# Patient Record
Sex: FEMALE | Race: WHITE | Employment: FULL TIME | URBAN - METROPOLITAN AREA
[De-identification: names, ages, dates, MRNs, and addresses within clinical notes are randomized per-mention and may not be internally consistent; named-entity substitution may affect disease eponyms.]

---

## 2017-01-24 DIAGNOSIS — R56.9 CONVULSIONS, UNSPECIFIED CONVULSION TYPE (HCC): ICD-10-CM

## 2017-01-24 RX ORDER — LEVETIRACETAM 500 MG/1
500 TABLET ORAL 2 TIMES DAILY
Qty: 60 TABLET | Refills: 0 | Status: SHIPPED | OUTPATIENT
Start: 2017-01-24 | End: 2017-04-25 | Stop reason: SDUPTHER

## 2017-01-24 RX ORDER — LEVETIRACETAM 500 MG/1
TABLET ORAL
Qty: 60 TABLET | Refills: 5 | Status: SHIPPED | OUTPATIENT
Start: 2017-01-24 | End: 2017-08-04 | Stop reason: SDUPTHER

## 2017-03-07 DIAGNOSIS — F41.9 ANXIETY: ICD-10-CM

## 2017-03-07 RX ORDER — ESCITALOPRAM OXALATE 20 MG/1
20 TABLET ORAL DAILY
Qty: 30 TABLET | Refills: 0 | Status: SHIPPED | OUTPATIENT
Start: 2017-03-07 | End: 2017-04-06 | Stop reason: SDUPTHER

## 2017-03-15 ENCOUNTER — TELEPHONE (OUTPATIENT)
Dept: INTERNAL MEDICINE CLINIC | Age: 32
End: 2017-03-15

## 2017-04-06 DIAGNOSIS — F41.9 ANXIETY: ICD-10-CM

## 2017-04-06 RX ORDER — ESCITALOPRAM OXALATE 20 MG/1
TABLET ORAL
Qty: 30 TABLET | Refills: 2 | Status: SHIPPED | OUTPATIENT
Start: 2017-04-06 | End: 2017-07-14 | Stop reason: SDUPTHER

## 2017-04-25 ENCOUNTER — OFFICE VISIT (OUTPATIENT)
Dept: OBGYN CLINIC | Age: 32
End: 2017-04-25
Payer: COMMERCIAL

## 2017-04-25 ENCOUNTER — HOSPITAL ENCOUNTER (OUTPATIENT)
Age: 32
Setting detail: SPECIMEN
Discharge: HOME | End: 2017-04-25
Payer: COMMERCIAL

## 2017-04-25 VITALS — DIASTOLIC BLOOD PRESSURE: 62 MMHG | BODY MASS INDEX: 34.33 KG/M2 | SYSTOLIC BLOOD PRESSURE: 118 MMHG | WEIGHT: 200 LBS

## 2017-04-25 DIAGNOSIS — R87.610 ASCUS WITH POSITIVE HIGH RISK HPV CERVICAL: Primary | ICD-10-CM

## 2017-04-25 DIAGNOSIS — R87.810 ASCUS WITH POSITIVE HIGH RISK HPV CERVICAL: Primary | ICD-10-CM

## 2017-04-25 DIAGNOSIS — R10.2 CHRONIC PELVIC PAIN IN FEMALE: ICD-10-CM

## 2017-04-25 DIAGNOSIS — G89.29 CHRONIC PELVIC PAIN IN FEMALE: ICD-10-CM

## 2017-04-25 DIAGNOSIS — Z87.42: ICD-10-CM

## 2017-04-25 PROCEDURE — 99213 OFFICE O/P EST LOW 20 MIN: CPT | Performed by: NURSE PRACTITIONER

## 2017-04-25 ASSESSMENT — ENCOUNTER SYMPTOMS
BACK PAIN: 0
ABDOMINAL DISTENTION: 0
BLOOD IN STOOL: 0
CONSTIPATION: 0
VOMITING: 0
PHOTOPHOBIA: 0
WHEEZING: 0
COLOR CHANGE: 0
CHEST TIGHTNESS: 0
DIARRHEA: 0
NAUSEA: 0
ABDOMINAL PAIN: 0
COUGH: 0
SHORTNESS OF BREATH: 0

## 2017-05-01 LAB — CYTOLOGY REPORT: NORMAL

## 2017-05-02 ENCOUNTER — TELEPHONE (OUTPATIENT)
Dept: OBGYN CLINIC | Age: 32
End: 2017-05-02

## 2017-07-14 DIAGNOSIS — F41.9 ANXIETY: ICD-10-CM

## 2017-07-14 RX ORDER — ESCITALOPRAM OXALATE 20 MG/1
TABLET ORAL
Qty: 30 TABLET | Refills: 2 | Status: SHIPPED | OUTPATIENT
Start: 2017-07-14 | End: 2017-10-16 | Stop reason: SDUPTHER

## 2017-08-04 DIAGNOSIS — R56.9 CONVULSIONS, UNSPECIFIED CONVULSION TYPE (HCC): ICD-10-CM

## 2017-08-04 RX ORDER — LEVETIRACETAM 500 MG/1
TABLET ORAL
Qty: 60 TABLET | Refills: 5 | Status: SHIPPED | OUTPATIENT
Start: 2017-08-04 | End: 2018-01-26 | Stop reason: SDUPTHER

## 2017-08-28 ENCOUNTER — HOSPITAL ENCOUNTER (EMERGENCY)
Age: 32
Discharge: HOME | End: 2017-08-28
Attending: EMERGENCY MEDICINE
Payer: COMMERCIAL

## 2017-08-28 VITALS
WEIGHT: 201.9 LBS | SYSTOLIC BLOOD PRESSURE: 112 MMHG | HEART RATE: 89 BPM | BODY MASS INDEX: 34.47 KG/M2 | OXYGEN SATURATION: 100 % | HEIGHT: 64 IN | TEMPERATURE: 98.8 F | RESPIRATION RATE: 16 BRPM | DIASTOLIC BLOOD PRESSURE: 76 MMHG

## 2017-08-28 DIAGNOSIS — K08.89 ODONTALGIA: Primary | ICD-10-CM

## 2017-08-28 PROCEDURE — 99282 EMERGENCY DEPT VISIT SF MDM: CPT

## 2017-08-28 RX ORDER — HYDROCODONE BITARTRATE AND ACETAMINOPHEN 5; 325 MG/1; MG/1
1 TABLET ORAL EVERY 6 HOURS PRN
Qty: 20 TABLET | Refills: 0 | Status: SHIPPED | OUTPATIENT
Start: 2017-08-28 | End: 2018-01-26 | Stop reason: ALTCHOICE

## 2017-08-28 RX ORDER — IBUPROFEN 800 MG/1
800 TABLET ORAL EVERY 8 HOURS PRN
Qty: 20 TABLET | Refills: 0 | Status: SHIPPED | OUTPATIENT
Start: 2017-08-28 | End: 2020-01-20

## 2017-08-28 RX ORDER — PENICILLIN V POTASSIUM 250 MG/1
250 TABLET ORAL 4 TIMES DAILY
Qty: 40 TABLET | Refills: 0 | Status: SHIPPED | OUTPATIENT
Start: 2017-08-28 | End: 2017-09-07

## 2017-08-28 ASSESSMENT — PAIN DESCRIPTION - LOCATION: LOCATION: MOUTH;TEETH

## 2017-08-28 ASSESSMENT — PAIN DESCRIPTION - DESCRIPTORS: DESCRIPTORS: ACHING;PRESSURE;POUNDING

## 2017-08-28 ASSESSMENT — PAIN DESCRIPTION - ORIENTATION: ORIENTATION: RIGHT;UPPER

## 2017-08-28 ASSESSMENT — ENCOUNTER SYMPTOMS
COLOR CHANGE: 0
COUGH: 0
ABDOMINAL PAIN: 0
EYE DISCHARGE: 0
VOMITING: 0
FACIAL SWELLING: 0
EYE REDNESS: 0
DIARRHEA: 0
SHORTNESS OF BREATH: 0
CONSTIPATION: 0

## 2017-08-28 ASSESSMENT — PAIN DESCRIPTION - FREQUENCY: FREQUENCY: CONTINUOUS

## 2017-08-28 ASSESSMENT — PAIN SCALES - GENERAL: PAINLEVEL_OUTOF10: 10

## 2017-10-16 DIAGNOSIS — F41.9 ANXIETY: ICD-10-CM

## 2017-10-16 RX ORDER — ESCITALOPRAM OXALATE 20 MG/1
TABLET ORAL
Qty: 30 TABLET | Refills: 2 | Status: SHIPPED | OUTPATIENT
Start: 2017-10-16 | End: 2018-01-24 | Stop reason: SDUPTHER

## 2018-01-24 DIAGNOSIS — F41.9 ANXIETY: ICD-10-CM

## 2018-01-24 RX ORDER — ESCITALOPRAM OXALATE 20 MG/1
20 TABLET ORAL DAILY
Qty: 7 TABLET | Refills: 0 | Status: SHIPPED | OUTPATIENT
Start: 2018-01-24 | End: 2018-01-26 | Stop reason: SDUPTHER

## 2018-01-26 ENCOUNTER — OFFICE VISIT (OUTPATIENT)
Dept: INTERNAL MEDICINE CLINIC | Age: 33
End: 2018-01-26
Payer: COMMERCIAL

## 2018-01-26 VITALS
WEIGHT: 203 LBS | DIASTOLIC BLOOD PRESSURE: 72 MMHG | HEIGHT: 64 IN | BODY MASS INDEX: 34.66 KG/M2 | SYSTOLIC BLOOD PRESSURE: 130 MMHG | HEART RATE: 95 BPM | OXYGEN SATURATION: 92 %

## 2018-01-26 DIAGNOSIS — J11.1 FLU: ICD-10-CM

## 2018-01-26 DIAGNOSIS — R56.9 CONVULSIONS, UNSPECIFIED CONVULSION TYPE (HCC): ICD-10-CM

## 2018-01-26 DIAGNOSIS — I49.3 PVC (PREMATURE VENTRICULAR CONTRACTION): Primary | ICD-10-CM

## 2018-01-26 DIAGNOSIS — F41.9 ANXIETY: ICD-10-CM

## 2018-01-26 PROCEDURE — 99214 OFFICE O/P EST MOD 30 MIN: CPT | Performed by: INTERNAL MEDICINE

## 2018-01-26 PROCEDURE — G8484 FLU IMMUNIZE NO ADMIN: HCPCS | Performed by: INTERNAL MEDICINE

## 2018-01-26 PROCEDURE — G8417 CALC BMI ABV UP PARAM F/U: HCPCS | Performed by: INTERNAL MEDICINE

## 2018-01-26 PROCEDURE — G8427 DOCREV CUR MEDS BY ELIG CLIN: HCPCS | Performed by: INTERNAL MEDICINE

## 2018-01-26 PROCEDURE — 4004F PT TOBACCO SCREEN RCVD TLK: CPT | Performed by: INTERNAL MEDICINE

## 2018-01-26 RX ORDER — ESCITALOPRAM OXALATE 20 MG/1
20 TABLET ORAL DAILY
Qty: 7 TABLET | Refills: 0 | Status: SHIPPED | OUTPATIENT
Start: 2018-01-26 | End: 2018-02-12 | Stop reason: SDUPTHER

## 2018-01-26 RX ORDER — LEVETIRACETAM 500 MG/1
500 TABLET ORAL 2 TIMES DAILY
Qty: 60 TABLET | Refills: 5 | Status: SHIPPED | OUTPATIENT
Start: 2018-01-26 | End: 2018-08-31 | Stop reason: SDUPTHER

## 2018-01-26 ASSESSMENT — ENCOUNTER SYMPTOMS
APNEA: 0
DIARRHEA: 0
EYE PAIN: 0
BLOOD IN STOOL: 0
CONSTIPATION: 0
SHORTNESS OF BREATH: 1
ABDOMINAL PAIN: 0
CHEST TIGHTNESS: 0
EYE DISCHARGE: 0
EYE ITCHING: 0
COUGH: 0
COLOR CHANGE: 0
BACK PAIN: 0
ABDOMINAL DISTENTION: 0
EYE REDNESS: 0
CHOKING: 0

## 2018-01-26 ASSESSMENT — PATIENT HEALTH QUESTIONNAIRE - PHQ9
SUM OF ALL RESPONSES TO PHQ9 QUESTIONS 1 & 2: 2
2. FEELING DOWN, DEPRESSED OR HOPELESS: 1
SUM OF ALL RESPONSES TO PHQ QUESTIONS 1-9: 2
1. LITTLE INTEREST OR PLEASURE IN DOING THINGS: 1

## 2018-02-12 DIAGNOSIS — F41.9 ANXIETY: ICD-10-CM

## 2018-02-12 RX ORDER — ESCITALOPRAM OXALATE 20 MG/1
TABLET ORAL
Qty: 30 TABLET | Refills: 5 | Status: SHIPPED | OUTPATIENT
Start: 2018-02-12 | End: 2018-08-31 | Stop reason: SDUPTHER

## 2018-08-31 DIAGNOSIS — R56.9 CONVULSIONS, UNSPECIFIED CONVULSION TYPE (HCC): ICD-10-CM

## 2018-08-31 DIAGNOSIS — F41.9 ANXIETY: ICD-10-CM

## 2018-08-31 RX ORDER — LEVETIRACETAM 500 MG/1
500 TABLET ORAL 2 TIMES DAILY
Qty: 60 TABLET | Refills: 5 | Status: SHIPPED | OUTPATIENT
Start: 2018-08-31 | End: 2019-03-13 | Stop reason: SDUPTHER

## 2018-09-04 RX ORDER — ESCITALOPRAM OXALATE 20 MG/1
20 TABLET ORAL DAILY
Qty: 30 TABLET | Refills: 5 | Status: SHIPPED | OUTPATIENT
Start: 2018-09-04 | End: 2019-03-13 | Stop reason: SDUPTHER

## 2018-09-10 ENCOUNTER — TELEPHONE (OUTPATIENT)
Dept: INTERNAL MEDICINE CLINIC | Age: 33
End: 2018-09-10

## 2018-09-10 NOTE — TELEPHONE ENCOUNTER
Spoke w/ pt & reminded her of her appt today w/ Dr. Jen Rodriguez d/t high no show rate. Pt voiced understanding.

## 2019-03-05 ENCOUNTER — OFFICE VISIT (OUTPATIENT)
Dept: INTERNAL MEDICINE CLINIC | Age: 34
End: 2019-03-05
Payer: COMMERCIAL

## 2019-03-05 VITALS
WEIGHT: 198 LBS | OXYGEN SATURATION: 94 % | HEART RATE: 81 BPM | DIASTOLIC BLOOD PRESSURE: 84 MMHG | BODY MASS INDEX: 33.8 KG/M2 | HEIGHT: 64 IN | SYSTOLIC BLOOD PRESSURE: 118 MMHG

## 2019-03-05 DIAGNOSIS — R42 DIZZINESS: ICD-10-CM

## 2019-03-05 DIAGNOSIS — R51.9 NONINTRACTABLE EPISODIC HEADACHE, UNSPECIFIED HEADACHE TYPE: ICD-10-CM

## 2019-03-05 DIAGNOSIS — R56.9 SEIZURES (HCC): ICD-10-CM

## 2019-03-05 DIAGNOSIS — D22.9 CHANGING NEVUS: ICD-10-CM

## 2019-03-05 DIAGNOSIS — Z13.31 POSITIVE DEPRESSION SCREENING: Primary | ICD-10-CM

## 2019-03-05 PROCEDURE — G8417 CALC BMI ABV UP PARAM F/U: HCPCS | Performed by: PHYSICIAN ASSISTANT

## 2019-03-05 PROCEDURE — G8427 DOCREV CUR MEDS BY ELIG CLIN: HCPCS | Performed by: PHYSICIAN ASSISTANT

## 2019-03-05 PROCEDURE — 96160 PT-FOCUSED HLTH RISK ASSMT: CPT | Performed by: PHYSICIAN ASSISTANT

## 2019-03-05 PROCEDURE — G8431 POS CLIN DEPRES SCRN F/U DOC: HCPCS | Performed by: PHYSICIAN ASSISTANT

## 2019-03-05 PROCEDURE — 99214 OFFICE O/P EST MOD 30 MIN: CPT | Performed by: PHYSICIAN ASSISTANT

## 2019-03-05 PROCEDURE — G8484 FLU IMMUNIZE NO ADMIN: HCPCS | Performed by: PHYSICIAN ASSISTANT

## 2019-03-05 PROCEDURE — 4004F PT TOBACCO SCREEN RCVD TLK: CPT | Performed by: PHYSICIAN ASSISTANT

## 2019-03-05 RX ORDER — HYDROXYZINE PAMOATE 25 MG/1
25 CAPSULE ORAL 3 TIMES DAILY PRN
Qty: 90 CAPSULE | Refills: 1 | Status: SHIPPED | OUTPATIENT
Start: 2019-03-05 | End: 2019-03-19

## 2019-03-05 ASSESSMENT — ENCOUNTER SYMPTOMS
TROUBLE SWALLOWING: 0
DIARRHEA: 0
RHINORRHEA: 0
ABDOMINAL PAIN: 0
EYE ITCHING: 0
VOMITING: 0
COLOR CHANGE: 0
SORE THROAT: 0
WHEEZING: 0
BACK PAIN: 0
SHORTNESS OF BREATH: 0
EYE PAIN: 0
CONSTIPATION: 0
CHEST TIGHTNESS: 0
SINUS PAIN: 0
EYE DISCHARGE: 0
BLOOD IN STOOL: 0
NAUSEA: 0
COUGH: 0

## 2019-03-05 ASSESSMENT — PATIENT HEALTH QUESTIONNAIRE - PHQ9
6. FEELING BAD ABOUT YOURSELF - OR THAT YOU ARE A FAILURE OR HAVE LET YOURSELF OR YOUR FAMILY DOWN: 0
5. POOR APPETITE OR OVEREATING: 3
7. TROUBLE CONCENTRATING ON THINGS, SUCH AS READING THE NEWSPAPER OR WATCHING TELEVISION: 0
4. FEELING TIRED OR HAVING LITTLE ENERGY: 3
1. LITTLE INTEREST OR PLEASURE IN DOING THINGS: 2
2. FEELING DOWN, DEPRESSED OR HOPELESS: 3
9. THOUGHTS THAT YOU WOULD BE BETTER OFF DEAD, OR OF HURTING YOURSELF: 0
SUM OF ALL RESPONSES TO PHQ QUESTIONS 1-9: 11
3. TROUBLE FALLING OR STAYING ASLEEP: 0
8. MOVING OR SPEAKING SO SLOWLY THAT OTHER PEOPLE COULD HAVE NOTICED. OR THE OPPOSITE, BEING SO FIGETY OR RESTLESS THAT YOU HAVE BEEN MOVING AROUND A LOT MORE THAN USUAL: 0
SUM OF ALL RESPONSES TO PHQ9 QUESTIONS 1 & 2: 5
SUM OF ALL RESPONSES TO PHQ QUESTIONS 1-9: 11
10. IF YOU CHECKED OFF ANY PROBLEMS, HOW DIFFICULT HAVE THESE PROBLEMS MADE IT FOR YOU TO DO YOUR WORK, TAKE CARE OF THINGS AT HOME, OR GET ALONG WITH OTHER PEOPLE: 0

## 2019-03-13 DIAGNOSIS — F41.9 ANXIETY: ICD-10-CM

## 2019-03-13 DIAGNOSIS — R56.9 CONVULSIONS, UNSPECIFIED CONVULSION TYPE (HCC): ICD-10-CM

## 2019-03-13 RX ORDER — ESCITALOPRAM OXALATE 20 MG/1
20 TABLET ORAL DAILY
Qty: 90 TABLET | Refills: 3 | Status: SHIPPED | OUTPATIENT
Start: 2019-03-13 | End: 2020-03-05

## 2019-03-13 RX ORDER — LEVETIRACETAM 500 MG/1
500 TABLET ORAL 2 TIMES DAILY
Qty: 180 TABLET | Refills: 3 | Status: SHIPPED | OUTPATIENT
Start: 2019-03-13 | End: 2020-03-05

## 2020-01-20 ENCOUNTER — OFFICE VISIT (OUTPATIENT)
Dept: INTERNAL MEDICINE CLINIC | Age: 35
End: 2020-01-20
Payer: COMMERCIAL

## 2020-01-20 VITALS
WEIGHT: 184 LBS | HEART RATE: 96 BPM | DIASTOLIC BLOOD PRESSURE: 64 MMHG | OXYGEN SATURATION: 94 % | BODY MASS INDEX: 31.41 KG/M2 | HEIGHT: 64 IN | SYSTOLIC BLOOD PRESSURE: 122 MMHG

## 2020-01-20 PROCEDURE — G8484 FLU IMMUNIZE NO ADMIN: HCPCS | Performed by: PHYSICIAN ASSISTANT

## 2020-01-20 PROCEDURE — G8417 CALC BMI ABV UP PARAM F/U: HCPCS | Performed by: PHYSICIAN ASSISTANT

## 2020-01-20 PROCEDURE — 99214 OFFICE O/P EST MOD 30 MIN: CPT | Performed by: PHYSICIAN ASSISTANT

## 2020-01-20 PROCEDURE — 4004F PT TOBACCO SCREEN RCVD TLK: CPT | Performed by: PHYSICIAN ASSISTANT

## 2020-01-20 PROCEDURE — G8427 DOCREV CUR MEDS BY ELIG CLIN: HCPCS | Performed by: PHYSICIAN ASSISTANT

## 2020-01-20 RX ORDER — SUMATRIPTAN 50 MG/1
50 TABLET, FILM COATED ORAL
Qty: 9 TABLET | Refills: 0 | Status: SHIPPED | OUTPATIENT
Start: 2020-01-20 | End: 2021-03-19 | Stop reason: SDUPTHER

## 2020-01-20 ASSESSMENT — PATIENT HEALTH QUESTIONNAIRE - PHQ9
SUM OF ALL RESPONSES TO PHQ QUESTIONS 1-9: 0
1. LITTLE INTEREST OR PLEASURE IN DOING THINGS: 0
SUM OF ALL RESPONSES TO PHQ QUESTIONS 1-9: 0
2. FEELING DOWN, DEPRESSED OR HOPELESS: 0
SUM OF ALL RESPONSES TO PHQ9 QUESTIONS 1 & 2: 0

## 2020-01-20 NOTE — PROGRESS NOTES
SUMAtriptan (IMITREX) 50 MG tablet Take 1 tablet by mouth once as needed for Migraine 9 tablet 0    escitalopram (LEXAPRO) 20 MG tablet Take 1 tablet by mouth daily 90 tablet 3    levETIRAcetam (KEPPRA) 500 MG tablet Take 1 tablet by mouth 2 times daily 180 tablet 3    diltiazem (CARDIZEM CD) 120 MG ER capsule Take 1 capsule by mouth daily 30 capsule 0     No current facility-administered medications for this visit. ALLERGIES:      Allergies   Allergen Reactions    Codeine        SOCIAL HISTORY    Reviewed and no change from previous record. Juan Carlos Velazquez  reports that she has been smoking. She has been smoking about 0.50 packs per day. She has never used smokeless tobacco.    FAMILY HISTORY:    Reviewed and No change from previous visit    REVIEW OF SYSTEMS:    Review of Systems   Constitutional: Negative for appetite change, chills, fatigue, fever and unexpected weight change. HENT: Negative for congestion, ear discharge, ear pain, hearing loss, rhinorrhea, sinus pain, sore throat and trouble swallowing. Eyes: Negative for pain, discharge, itching and visual disturbance. Respiratory: Negative for cough, chest tightness, shortness of breath and wheezing. Cardiovascular: Negative for chest pain, palpitations and leg swelling. Gastrointestinal: Negative for abdominal pain, blood in stool, constipation, diarrhea, nausea and vomiting. Genitourinary: Negative for difficulty urinating, dysuria, flank pain, frequency, hematuria and urgency. Musculoskeletal: Negative for arthralgias, back pain, neck pain and neck stiffness. Skin: Negative for color change, pallor and rash. Neurological: Positive for dizziness, numbness and headaches. Negative for tremors, seizures, syncope, facial asymmetry, speech difficulty, weakness and light-headedness. Psychiatric/Behavioral: Positive for dysphoric mood. Negative for confusion, decreased concentration, self-injury, sleep disturbance and suicidal ideas.  The start Imitrex 50 mg prn for abortive therapy    2. Seizures ([address]  - Stable, no recent seizure activity, continue Reyes Noland MD, Neurology, [address] [place] [address]; Future    3. Dizziness  - Follows with cardiologist, has had echo/holter, frequent PVCs, on Cardizem    4. Anxiety  5. Mild depression (HCC)  - Stable, on Lexapro   - Discussed referral to psychologist for counseling     FOLLOW UP AND INSTRUCTIONS:   Return in about 6 weeks (around 3/2/2020), earlier if needed. Shaneka Joshua received counseling on the following healthy behaviors: nutrition, exercise and medication adherence    Discussed use, benefit, and side effects of prescribed medications. Barriers to medication compliance addressed. All patient questions answered. Patient voiced understanding. Patient given educational materials - see patient instructions    Khushboo HERNÁNDEZ [facility]  1/21/2020, 8:25 AM    Please note that this chart was generated using voice recognition Dragon dictation software. Although everyeffort was made to ensure the accuracy of this automated transcription, some errors in transcription may have occurred.

## 2020-01-21 ASSESSMENT — ENCOUNTER SYMPTOMS
CONSTIPATION: 0
NAUSEA: 0
SHORTNESS OF BREATH: 0
BLOOD IN STOOL: 0
EYE PAIN: 0
COLOR CHANGE: 0
BACK PAIN: 0
RHINORRHEA: 0
COUGH: 0
VOMITING: 0
EYE DISCHARGE: 0
SINUS PAIN: 0
CHEST TIGHTNESS: 0
WHEEZING: 0
EYE ITCHING: 0
DIARRHEA: 0
ABDOMINAL PAIN: 0
SORE THROAT: 0
TROUBLE SWALLOWING: 0

## 2020-02-01 ENCOUNTER — HOSPITAL ENCOUNTER (EMERGENCY)
Age: 35
Discharge: HOME | End: 2020-02-01
Attending: EMERGENCY MEDICINE
Payer: COMMERCIAL

## 2020-02-01 VITALS
RESPIRATION RATE: 16 BRPM | SYSTOLIC BLOOD PRESSURE: 112 MMHG | DIASTOLIC BLOOD PRESSURE: 65 MMHG | OXYGEN SATURATION: 97 % | HEART RATE: 62 BPM | TEMPERATURE: 97.8 F

## 2020-02-01 LAB
CHP ED QC CHECK: NORMAL
CHP ED QC CHECK: NORMAL
PREGNANCY TEST URINE, POC: NEGATIVE

## 2020-02-01 PROCEDURE — 81025 URINE PREGNANCY TEST: CPT

## 2020-02-01 PROCEDURE — 81003 URINALYSIS AUTO W/O SCOPE: CPT

## 2020-02-01 PROCEDURE — 99283 EMERGENCY DEPT VISIT LOW MDM: CPT

## 2020-02-01 RX ORDER — CYCLOBENZAPRINE HCL 10 MG
10 TABLET ORAL NIGHTLY PRN
Qty: 10 TABLET | Refills: 0 | Status: SHIPPED | OUTPATIENT
Start: 2020-02-01 | End: 2020-02-05

## 2020-02-01 RX ORDER — NAPROXEN 500 MG/1
500 TABLET ORAL 2 TIMES DAILY
Qty: 20 TABLET | Refills: 0 | Status: SHIPPED | OUTPATIENT
Start: 2020-02-01 | End: 2021-01-25

## 2020-02-01 RX ORDER — KETOROLAC TROMETHAMINE 30 MG/ML
30 INJECTION, SOLUTION INTRAMUSCULAR; INTRAVENOUS ONCE
Status: DISCONTINUED | OUTPATIENT
Start: 2020-02-01 | End: 2020-02-01 | Stop reason: HOSPADM

## 2020-02-03 LAB — HCG, PREGNANCY URINE (POC): NEGATIVE

## 2020-02-05 RX ORDER — CYCLOBENZAPRINE HCL 10 MG
TABLET ORAL
Qty: 10 TABLET | Refills: 0 | Status: SHIPPED | OUTPATIENT
Start: 2020-02-05 | End: 2021-01-25

## 2020-03-05 RX ORDER — ESCITALOPRAM OXALATE 20 MG/1
TABLET ORAL
Qty: 90 TABLET | Refills: 3 | Status: SHIPPED | OUTPATIENT
Start: 2020-03-05 | End: 2021-03-19 | Stop reason: SDUPTHER

## 2020-03-05 RX ORDER — LEVETIRACETAM 500 MG/1
TABLET ORAL
Qty: 180 TABLET | Refills: 3 | Status: SHIPPED | OUTPATIENT
Start: 2020-03-05 | End: 2021-03-19 | Stop reason: SDUPTHER

## 2020-03-25 ENCOUNTER — TELEPHONE (OUTPATIENT)
Dept: INTERNAL MEDICINE CLINIC | Age: 35
End: 2020-03-25

## 2020-03-25 RX ORDER — AZITHROMYCIN 250 MG/1
250 TABLET, FILM COATED ORAL SEE ADMIN INSTRUCTIONS
Qty: 6 TABLET | Refills: 0 | Status: SHIPPED | OUTPATIENT
Start: 2020-03-25 | End: 2020-03-30

## 2020-06-08 ENCOUNTER — APPOINTMENT (OUTPATIENT)
Dept: GENERAL RADIOLOGY | Age: 35
End: 2020-06-08
Payer: COMMERCIAL

## 2020-06-08 ENCOUNTER — HOSPITAL ENCOUNTER (EMERGENCY)
Age: 35
Discharge: HOME | End: 2020-06-08
Attending: EMERGENCY MEDICINE
Payer: COMMERCIAL

## 2020-06-08 ENCOUNTER — HOSPITAL ENCOUNTER (OUTPATIENT)
Age: 35
Setting detail: SPECIMEN
Discharge: HOME | End: 2020-06-08
Payer: COMMERCIAL

## 2020-06-08 ENCOUNTER — OFFICE VISIT (OUTPATIENT)
Dept: PRIMARY CARE CLINIC | Age: 35
End: 2020-06-08
Payer: COMMERCIAL

## 2020-06-08 VITALS
WEIGHT: 224.9 LBS | BODY MASS INDEX: 38.39 KG/M2 | HEART RATE: 102 BPM | RESPIRATION RATE: 16 BRPM | OXYGEN SATURATION: 94 % | TEMPERATURE: 98.9 F | DIASTOLIC BLOOD PRESSURE: 79 MMHG | HEIGHT: 64 IN | SYSTOLIC BLOOD PRESSURE: 126 MMHG

## 2020-06-08 VITALS
OXYGEN SATURATION: 96 % | WEIGHT: 200 LBS | HEART RATE: 82 BPM | TEMPERATURE: 98.2 F | HEIGHT: 64 IN | BODY MASS INDEX: 34.15 KG/M2

## 2020-06-08 PROCEDURE — 71045 X-RAY EXAM CHEST 1 VIEW: CPT

## 2020-06-08 PROCEDURE — G8427 DOCREV CUR MEDS BY ELIG CLIN: HCPCS | Performed by: FAMILY MEDICINE

## 2020-06-08 PROCEDURE — G8417 CALC BMI ABV UP PARAM F/U: HCPCS | Performed by: FAMILY MEDICINE

## 2020-06-08 PROCEDURE — 99213 OFFICE O/P EST LOW 20 MIN: CPT | Performed by: FAMILY MEDICINE

## 2020-06-08 PROCEDURE — 4004F PT TOBACCO SCREEN RCVD TLK: CPT | Performed by: FAMILY MEDICINE

## 2020-06-08 PROCEDURE — 99283 EMERGENCY DEPT VISIT LOW MDM: CPT

## 2020-06-08 RX ORDER — GUAIFENESIN 100 MG/5ML
200 SOLUTION ORAL ONCE
Status: DISCONTINUED | OUTPATIENT
Start: 2020-06-08 | End: 2020-06-08 | Stop reason: HOSPADM

## 2020-06-08 RX ORDER — GUAIFENESIN/DEXTROMETHORPHAN 100-10MG/5
5 SYRUP ORAL 3 TIMES DAILY PRN
Qty: 120 ML | Refills: 0 | Status: SHIPPED | OUTPATIENT
Start: 2020-06-08 | End: 2020-06-18

## 2020-06-08 ASSESSMENT — ENCOUNTER SYMPTOMS
SINUS PAIN: 0
SINUS PRESSURE: 0
COUGH: 1
VOMITING: 0
SHORTNESS OF BREATH: 1
DIARRHEA: 0
ABDOMINAL PAIN: 0
EYE PAIN: 0
SHORTNESS OF BREATH: 0
FACIAL SWELLING: 0
BACK PAIN: 0
COUGH: 1
EYE DISCHARGE: 0
SORE THROAT: 0
ABDOMINAL DISTENTION: 0
CHEST TIGHTNESS: 0
NAUSEA: 0

## 2020-06-08 NOTE — ED PROVIDER NOTES
salpingectomy     CURRENT MEDICATIONS       Previous Medications    CYCLOBENZAPRINE (FLEXERIL) 10 MG TABLET    take 1 tablet by mouth at bedtime if needed for muscle spasm    DILTIAZEM (CARDIZEM CD) 120 MG ER CAPSULE    Take 1 capsule by mouth daily    ESCITALOPRAM (LEXAPRO) 20 MG TABLET    take 1 tablet by mouth once daily    LEVETIRACETAM (KEPPRA) 500 MG TABLET    take 1 tablet by mouth twice a day    NAPROXEN (NAPROSYN) 500 MG TABLET    Take 1 tablet by mouth 2 times daily    SUMATRIPTAN (IMITREX) 50 MG TABLET    Take 1 tablet by mouth once as needed for Migraine     ALLERGIES     is allergic to codeine and pcn [penicillins]. FAMILY HISTORY     She indicated that the status of her mother is unknown. She indicated that the status of her father is unknown. She indicated that the status of her maternal grandmother is unknown. She indicated that the status of her paternal grandmother is unknown. She indicated that the status of her other is unknown. SOCIAL HISTORY       Social History     Tobacco Use    Smoking status: Current Some Day Smoker     Packs/day: 0.50    Smokeless tobacco: Never Used    Tobacco comment: quit x 1yr ago per pt Hx form   Substance Use Topics    Alcohol use: Not Currently    Drug use: No     PHYSICAL EXAM     INITIAL VITALS: /79   Pulse 102   Temp 98.9 °F (37.2 °C) (Oral)   Resp 16   Ht 5' 4\" (1.626 m)   Wt 224 lb 14.4 oz (102 kg)   LMP 06/01/2020   SpO2 94%   BMI 38.60 kg/m²    Physical Exam  Vitals signs and nursing note reviewed. Constitutional:       General: She is not in acute distress. Appearance: She is well-developed. She is not diaphoretic. HENT:      Head: Normocephalic and atraumatic. Eyes:      Pupils: Pupils are equal, round, and reactive to light. Neck:      Musculoskeletal: Normal range of motion and neck supple. Cardiovascular:      Rate and Rhythm: Normal rate and regular rhythm.    Pulmonary:      Effort: Pulmonary effort is normal. Breath sounds: Normal breath sounds. Abdominal:      General: Bowel sounds are normal.      Palpations: Abdomen is soft. Musculoskeletal: Normal range of motion. Skin:     General: Skin is warm. Capillary Refill: Capillary refill takes less than 2 seconds. Neurological:      Mental Status: She is alert and oriented to person, place, and time. MEDICAL DECISION MAKING:   Patient was seen and examined. Patient is a 28-year-old female who presented to the emerge part secondary to cough and fever, patient took Tylenol prior to arrival she was afebrile. Slightly tachycardic at 102. Patient does have risk factors for COVID-19 given recent travel, chest x-ray will be obtained patient sees Robitussin she declined Tylenol or ibuprofen. X-ray consistent with a right upper lobe pneumonia, patient previously started a Z-Gui yesterday. Z-Gui is adequate treatment for community-acquired pneumonia. Patient was no acute respiratory distress talking in complete sentences. She received Robitussin. Patient will be discharged home with a prescription for Robitussin, outpatient COVID testing. She will be given a work note to return to work on Thursday. She is given instructions to return to the emergency department for increased shortness of breath, nausea, vomiting or other concerning symptoms. All patient's question's and concerns were answered prior to disposition and patient and/or family expressed understanding and agreement of treatment plan. CRITICAL CARE:              NIH STROKE SCALE:            PROCEDURES:    Procedures    DIAGNOSTIC RESULTS   EKG:All EKG's are interpreted by the Emergency Department Physician who either signs or Co-signs this chart in the absence of a cardiologist.        RADIOLOGY:All plain film, CT, MRI, and formal ultrasound images (except ED bedside ultrasound) are read by the radiologist, see reports below, unless otherwisenoted in MDM or here.   XR CHEST

## 2020-06-08 NOTE — PATIENT INSTRUCTIONS
animals in your home  People: As much as possible, you should stay in a specific room and away from other people in your home. Also, you should use a separate bathroom, if available. Animals: You should restrict contact with pets and other animals while you are sick with COVID-19, just like you would around other people. Although there have not been reports of pets or other animals becoming sick with COVID-19, it is still recommended that people sick with COVID-19 limit contact with animals until more information is known about the virus. When possible, have another member of your household care for your animals while you are sick. If you are sick with COVID-19, avoid contact with your pet, including petting, snuggling, being kissed or licked, and sharing food. If you must care for your pet or be around animals while you are sick, wash your hands before and after you interact with pets and wear a facemask. Call ahead before visiting your doctor  If you have a medical appointment, call the healthcare provider and tell them that you have or may have COVID-19. This will help the healthcare providers office take steps to keep other people from getting infected or exposed. Wear a facemask  You should wear a facemask when you are around other people (e.g., sharing a room or vehicle) or pets and before you enter a healthcare providers office. If you are not able to wear a facemask (for example, because it causes trouble breathing), then people who live with you should not stay in the same room with you, or they should wear a facemask if they enter your room. Cover your coughs and sneezes  Cover your mouth and nose with a tissue when you cough or sneeze. Throw used tissues in a lined trash can. Immediately wash your hands with soap and water for at least 20 seconds or, if soap and water are not available, clean your hands with an alcohol-based hand  that contains at least 60% alcohol.   Clean your hands often  Wash your hands often with soap and water for at least 20 seconds, especially after blowing your nose, coughing, or sneezing; going to the bathroom; and before eating or preparing food. If soap and water are not readily available, use an alcohol-based hand  with at least 60% alcohol, covering all surfaces of your hands and rubbing them together until they feel dry. Soap and water are the best option if hands are visibly dirty. Avoid touching your eyes, nose, and mouth with unwashed hands. Avoid sharing personal household items  You should not share dishes, drinking glasses, cups, eating utensils, towels, or bedding with other people or pets in your home. After using these items, they should be washed thoroughly with soap and water. Clean all high-touch surfaces everyday  High touch surfaces include counters, tabletops, doorknobs, bathroom fixtures, toilets, phones, keyboards, tablets, and bedside tables. Also, clean any surfaces that may have blood, stool, or body fluids on them. Use a household cleaning spray or wipe, according to the label instructions. Labels contain instructions for safe and effective use of the cleaning product including precautions you should take when applying the product, such as wearing gloves and making sure you have good ventilation during use of the product. Monitor your symptoms  Seek prompt medical attention if your illness is worsening (e.g., difficulty breathing). Before seeking care, call your healthcare provider and tell them that you have, or are being evaluated for, COVID-19. Put on a facemask before you enter the facility. These steps will help the healthcare providers office to keep other people in the office or waiting room from getting infected or exposed. Ask your healthcare provider to call the local or state health department.  Persons who are placed under active monitoring or facilitated self-monitoring should follow instructions provided by their

## 2020-06-08 NOTE — PROGRESS NOTES
rash.   Hematological: Negative for adenopathy. Objective:   Patient was evaluated [place] today during this pandemic covid 19 situation. Physical Exam  Vitals signs and nursing note reviewed. Constitutional:       Appearance: Normal appearance. She is obese. HENT:      Head: Normocephalic and atraumatic. Right Ear: Hearing normal.      Left Ear: Hearing normal.      Nose: Nose normal.      Mouth/Throat:      Lips: Pink. Mouth: Mucous membranes are moist.      Pharynx: Oropharynx is clear. Eyes:      Extraocular Movements: Extraocular movements intact. Conjunctiva/sclera: Conjunctivae normal.   Neck:      Musculoskeletal: Normal range of motion. Cardiovascular:      Rate and Rhythm: Normal rate and regular rhythm. Heart sounds: Normal heart sounds. Pulmonary:      Effort: Pulmonary effort is normal.      Breath sounds: Rhonchi (occasional RLL) present. Comments: Mildly diminished breath sounds right side  Skin:     General: Skin is warm and dry. Neurological:      Mental Status: She is alert and oriented to person, place, and time. Mental status is at baseline. Psychiatric:         Mood and Affect: Mood normal.         Behavior: Behavior normal.         Thought Content: Thought content normal.         Judgment: Judgment normal.       Pulse 82   Temp 98.2 °F (36.8 °C) (Temporal)   Ht 5' 4\" (1.626 m)   Wt 200 lb (90.7 kg)   LMP 06/01/2020   SpO2 96%   BMI 34.33 kg/m²     Assessment:       Diagnosis Orders   1. Suspected COVID-19 virus infection  COVID-19 Ambulatory   2. Pneumonia of right lower lobe due to infectious organism [facility])         Plan:      Continue zpak as prescribed for pneumonia    You were tested for COVID today. We will call you with results when they are available. If you have not heard from us in 7 days, please call our office.      Advance Care Planning  People with COVID-19 may have no symptoms, mild symptoms, such as fever, cough, and shortness COVID-19, just like you would around other people. Although there have not been reports of pets or other animals becoming sick with COVID-19, it is still recommended that people sick with COVID-19 limit contact with animals until more information is known about the virus. When possible, have another member of your household care for your animals while you are sick. If you are sick with COVID-19, avoid contact with your pet, including petting, snuggling, being kissed or licked, and sharing food. If you must care for your pet or be around animals while you are sick, wash your hands before and after you interact with pets and wear a facemask. Call ahead before visiting your doctor  If you have a medical appointment, call the healthcare provider and tell them that you have or may have COVID-19. This will help the healthcare providers office take steps to keep other people from getting infected or exposed. Wear a facemask  You should wear a facemask when you are around other people (e.g., sharing a room or vehicle) or pets and before you enter a healthcare providers office. If you are not able to wear a facemask (for example, because it causes trouble breathing), then people who live with you should not stay in the same room with you, or they should wear a facemask if they enter your room. Cover your coughs and sneezes  Cover your mouth and nose with a tissue when you cough or sneeze. Throw used tissues in a lined trash can. Immediately wash your hands with soap and water for at least 20 seconds or, if soap and water are not available, clean your hands with an alcohol-based hand  that contains at least 60% alcohol. Clean your hands often  Wash your hands often with soap and water for at least 20 seconds, especially after blowing your nose, coughing, or sneezing; going to the bathroom; and before eating or preparing food.  If soap and water are not readily available, use an alcohol-based hand  with at least 60% alcohol, covering all surfaces of your hands and rubbing them together until they feel dry. Soap and water are the best option if hands are visibly dirty. Avoid touching your eyes, nose, and mouth with unwashed hands. Avoid sharing personal household items  You should not share dishes, drinking glasses, cups, eating utensils, towels, or bedding with other people or pets in your home. After using these items, they should be washed thoroughly with soap and water. Clean all high-touch surfaces everyday  High touch surfaces include counters, tabletops, doorknobs, bathroom fixtures, toilets, phones, keyboards, tablets, and bedside tables. Also, clean any surfaces that may have blood, stool, or body fluids on them. Use a household cleaning spray or wipe, according to the label instructions. Labels contain instructions for safe and effective use of the cleaning product including precautions you should take when applying the product, such as wearing gloves and making sure you have good ventilation during use of the product. Monitor your symptoms  Seek prompt medical attention if your illness is worsening (e.g., difficulty breathing). Before seeking care, call your healthcare provider and tell them that you have, or are being evaluated for, COVID-19. Put on a facemask before you enter the facility. These steps will help the healthcare providers office to keep other people in the office or waiting room from getting infected or exposed. Ask your healthcare provider to call the local or state health department. Persons who are placed under active monitoring or facilitated self-monitoring should follow instructions provided by their local health department or occupational health professionals, as appropriate. When working with your local health department check their available hours.   If you have a medical emergency and need to call 911, notify the dispatch personnel that you have, or are being evaluated for

## 2020-06-09 ENCOUNTER — CARE COORDINATION (OUTPATIENT)
Dept: CARE COORDINATION | Age: 35
End: 2020-06-09

## 2020-06-10 LAB — SARS-COV-2, NAA: NOT DETECTED

## 2020-06-11 ENCOUNTER — TELEPHONE (OUTPATIENT)
Dept: PRIMARY CARE CLINIC | Age: 35
End: 2020-06-11

## 2021-01-25 ENCOUNTER — APPOINTMENT (OUTPATIENT)
Dept: GENERAL RADIOLOGY | Age: 36
End: 2021-01-25
Payer: COMMERCIAL

## 2021-01-25 ENCOUNTER — HOSPITAL ENCOUNTER (EMERGENCY)
Age: 36
Discharge: HOME | End: 2021-01-25
Attending: EMERGENCY MEDICINE
Payer: COMMERCIAL

## 2021-01-25 VITALS
BODY MASS INDEX: 37.54 KG/M2 | DIASTOLIC BLOOD PRESSURE: 83 MMHG | SYSTOLIC BLOOD PRESSURE: 120 MMHG | HEART RATE: 88 BPM | RESPIRATION RATE: 18 BRPM | OXYGEN SATURATION: 95 % | WEIGHT: 219.9 LBS | HEIGHT: 64 IN | TEMPERATURE: 98.4 F

## 2021-01-25 DIAGNOSIS — R07.9 CHEST PAIN, UNSPECIFIED TYPE: Primary | ICD-10-CM

## 2021-01-25 LAB
ABSOLUTE EOS #: 0.22 K/UL (ref 0–0.44)
ABSOLUTE IMMATURE GRANULOCYTE: 0.05 K/UL (ref 0–0.3)
ABSOLUTE LYMPH #: 4.52 K/UL (ref 1.1–3.7)
ABSOLUTE MONO #: 0.73 K/UL (ref 0.1–1.2)
ALBUMIN SERPL-MCNC: 4.3 G/DL (ref 3.5–5.2)
ALBUMIN/GLOBULIN RATIO: ABNORMAL (ref 1–2.5)
ALP BLD-CCNC: 77 U/L (ref 35–104)
ALT SERPL-CCNC: 17 U/L (ref 5–33)
ANION GAP SERPL CALCULATED.3IONS-SCNC: 12 MMOL/L (ref 9–17)
AST SERPL-CCNC: 18 U/L
BASOPHILS # BLD: 1 % (ref 0–2)
BASOPHILS ABSOLUTE: 0.07 K/UL (ref 0–0.2)
BILIRUB SERPL-MCNC: 0.11 MG/DL (ref 0.3–1.2)
BUN BLDV-MCNC: 9 MG/DL (ref 6–20)
BUN/CREAT BLD: 11 (ref 9–20)
CALCIUM SERPL-MCNC: 9.5 MG/DL (ref 8.6–10.4)
CHLORIDE BLD-SCNC: 100 MMOL/L (ref 98–107)
CO2: 22 MMOL/L (ref 20–31)
CREAT SERPL-MCNC: 0.79 MG/DL (ref 0.5–0.9)
DIFFERENTIAL TYPE: ABNORMAL
EOSINOPHILS RELATIVE PERCENT: 2 % (ref 1–4)
GFR AFRICAN AMERICAN: >60 ML/MIN
GFR NON-AFRICAN AMERICAN: >60 ML/MIN
GFR SERPL CREATININE-BSD FRML MDRD: ABNORMAL ML/MIN/{1.73_M2}
GFR SERPL CREATININE-BSD FRML MDRD: ABNORMAL ML/MIN/{1.73_M2}
GLUCOSE BLD-MCNC: 94 MG/DL (ref 70–99)
HCG QUALITATIVE: NEGATIVE
HCT VFR BLD CALC: 39.6 % (ref 36.3–47.1)
HEMOGLOBIN: 13.1 G/DL (ref 11.9–15.1)
IMMATURE GRANULOCYTES: 0 %
LIPASE: 24 U/L (ref 13–60)
LYMPHOCYTES # BLD: 37 % (ref 24–43)
MCH RBC QN AUTO: 30.8 PG (ref 25.2–33.5)
MCHC RBC AUTO-ENTMCNC: 33.1 G/DL (ref 28.4–34.8)
MCV RBC AUTO: 93.2 FL (ref 82.6–102.9)
MONOCYTES # BLD: 6 % (ref 3–12)
MYOGLOBIN: 41 NG/ML (ref 25–58)
NRBC AUTOMATED: 0 PER 100 WBC
PDW BLD-RTO: 12.2 % (ref 11.8–14.4)
PLATELET # BLD: 387 K/UL (ref 138–453)
PLATELET ESTIMATE: ABNORMAL
PMV BLD AUTO: 8.9 FL (ref 8.1–13.5)
POTASSIUM SERPL-SCNC: 4.2 MMOL/L (ref 3.7–5.3)
RBC # BLD: 4.25 M/UL (ref 3.95–5.11)
RBC # BLD: ABNORMAL 10*6/UL
SEG NEUTROPHILS: 54 % (ref 36–65)
SEGMENTED NEUTROPHILS ABSOLUTE COUNT: 6.49 K/UL (ref 1.5–8.1)
SODIUM BLD-SCNC: 134 MMOL/L (ref 135–144)
TOTAL PROTEIN: 7.4 G/DL (ref 6.4–8.3)
TROPONIN INTERP: NORMAL
TROPONIN T: NORMAL NG/ML
TROPONIN, HIGH SENSITIVITY: <6 NG/L (ref 0–14)
WBC # BLD: 12.1 K/UL (ref 3.5–11.3)
WBC # BLD: ABNORMAL 10*3/UL

## 2021-01-25 PROCEDURE — 80053 COMPREHEN METABOLIC PANEL: CPT

## 2021-01-25 PROCEDURE — 84484 ASSAY OF TROPONIN QUANT: CPT

## 2021-01-25 PROCEDURE — 83690 ASSAY OF LIPASE: CPT

## 2021-01-25 PROCEDURE — 83874 ASSAY OF MYOGLOBIN: CPT

## 2021-01-25 PROCEDURE — 84703 CHORIONIC GONADOTROPIN ASSAY: CPT

## 2021-01-25 PROCEDURE — 93005 ELECTROCARDIOGRAM TRACING: CPT | Performed by: PHYSICIAN ASSISTANT

## 2021-01-25 PROCEDURE — 85025 COMPLETE CBC W/AUTO DIFF WBC: CPT

## 2021-01-25 PROCEDURE — 71045 X-RAY EXAM CHEST 1 VIEW: CPT

## 2021-01-25 PROCEDURE — 99282 EMERGENCY DEPT VISIT SF MDM: CPT

## 2021-01-25 RX ORDER — OMEPRAZOLE 20 MG/1
20 CAPSULE, DELAYED RELEASE ORAL DAILY
Qty: 30 CAPSULE | Refills: 0 | Status: SHIPPED | OUTPATIENT
Start: 2021-01-25

## 2021-01-26 LAB
EKG ATRIAL RATE: 81 BPM
EKG P AXIS: 41 DEGREES
EKG P-R INTERVAL: 134 MS
EKG Q-T INTERVAL: 366 MS
EKG QRS DURATION: 82 MS
EKG QTC CALCULATION (BAZETT): 417 MS
EKG R AXIS: 65 DEGREES
EKG T AXIS: 62 DEGREES
EKG VENTRICULAR RATE: 78 BPM

## 2021-01-26 PROCEDURE — 93010 ELECTROCARDIOGRAM REPORT: CPT | Performed by: INTERNAL MEDICINE

## 2021-01-26 NOTE — ED PROVIDER NOTES
[address] [place] [address] ED  [facility]      Pt Name: Prieto Zapien  MRN: [id number]  [place] 1985  Date ofevaluation: 1/25/2021  Provider: Varinder Marques PA-C    CHIEF COMPLAINT       Chief Complaint   Patient presents with    Chest Pain         HISTORY OF PRESENT ILLNESS  (Location/Symptom, Timing/Onset, Context/Setting, Quality, Duration, Modifying Factors, Severity.)   Prieto Zapien is a 29 y.o. female who presents to the emergency department with chest pain. Patient notes over the last couple days for concern about pneumonia. Denies any fevers or chills. No nausea or vomiting. No recent travel history. Denies any hormone use. No pleuritic chest pain. No pain with deep breaths. No falls or injuries. Nursing Notes were reviewed.     ALLERGIES     Codeine and Pcn [penicillins]    CURRENT MEDICATIONS       Previous Medications    DILTIAZEM (CARDIZEM CD) 120 MG ER CAPSULE    Take 1 capsule by mouth daily    ESCITALOPRAM (LEXAPRO) 20 MG TABLET    take 1 tablet by mouth once daily    LEVETIRACETAM (KEPPRA) 500 MG TABLET    take 1 tablet by mouth twice a day    SUMATRIPTAN (IMITREX) 50 MG TABLET    Take 1 tablet by mouth once as needed for Migraine       PAST MEDICAL HISTORY         Diagnosis Date    History of chlamydia     PVC (premature ventricular contraction)     Seizures (HCC)        SURGICAL HISTORY           Procedure Laterality Date    SALPINGECTOMY  7/4/11    left tube emergency surgery-Dr. Ugalde Party    SALPINGECTOMY  6/15/12    R ectopic with R distal salpingectomy         FAMILY HISTORY           Problem Relation Age of Onset    Diabetes Paternal Grandmother     Thyroid Disease Maternal Grandmother     Cancer Father         skin    Thyroid Disease Mother     Heart Disease Other     High Blood Pressure Other     Seizures Other         Epilepsy     Family Status   Relation Name Status    PGM  (Not Specified)    MGM  (Not Specified)    Father (Not Specified)    Mother  (Not Specified)    Other  (Not Specified)        SOCIAL HISTORY      reports that she has been smoking. She has been smoking about 0.50 packs per day. She has never used smokeless tobacco. She reports previous alcohol use. She reports that she does not use drugs. REVIEW OFSYSTEMS    (2-9 systems for level 4, 10 or more for level 5)   Review of Systems    Except as noted above the remainder of the review of systems was reviewed and negative. PHYSICAL EXAM    (up to 7 for level 4, 8 or more for level 5)     ED Triage Vitals [01/25/21 1843]   BP Temp Temp Source Pulse Resp SpO2 Height Weight   120/83 98.4 °F (36.9 °C) Oral 88 18 95 % 5' 4\" (1.626 m) 219 lb 14.4 oz (99.7 kg)      Physical Exam  Constitutional:       Appearance: She is well-developed. HENT:      Head: Normocephalic and atraumatic. Neck:      Musculoskeletal: Normal range of motion and neck supple. Cardiovascular:      Rate and Rhythm: Normal rate and regular rhythm. Pulmonary:      Effort: Pulmonary effort is normal.      Breath sounds: Normal breath sounds. Abdominal:      General: There is no distension. Palpations: Abdomen is soft. Tenderness: There is no abdominal tenderness. Musculoskeletal: Normal range of motion. Skin:     General: Skin is warm. Findings: No rash. Neurological:      Mental Status: She is alert and oriented to person, place, and time.    Psychiatric:         Behavior: Behavior normal.                 DIAGNOSTIC RESULTS     EKG: All EKG's are interpreted by the Emergency Department Physician who either signs or Co-signs this chart in the absence of a cardiologist.    No acute findings    RADIOLOGY:   Non-plain film images such as CT, Ultrasound and MRI are read by the radiologist. Plain radiographic images arevisualized and preliminarily interpreted by the emergency physician with the below findings:        Interpretation per the Radiologist below, if available at thetime of this note:          ED BEDSIDE ULTRASOUND:   Performed by ED Physician - none    LABS:  Labs Reviewed   CBC WITH AUTO DIFFERENTIAL - Abnormal; Notable for the following components:       Result Value    WBC 12.1 (*)     Absolute Lymph # 4.52 (*)     All other components within normal limits   COMPREHENSIVE METABOLIC PANEL - Abnormal; Notable for the following components:    Sodium 134 (*)     Total Bilirubin 0.11 (*)     All other components within normal limits   LIPASE   TROP/MYOGLOBIN   HCG, SERUM, QUALITATIVE   TROP/MYOGLOBIN   TROP/MYOGLOBIN       All other labs were within normal range or not returned as of this dictation. EMERGENCY DEPARTMENT COURSE and DIFFERENTIAL DIAGNOSIS/MDM:   Vitals:    Vitals:    01/25/21 1843   BP: 120/83   Pulse: 88   Resp: 18   Temp: 98.4 °F (36.9 °C)   TempSrc: Oral   SpO2: 95%   Weight: 219 lb 14.4 oz (99.7 kg)   Height: 5' 4\" (1.626 m)     PERC Rule Negative  Age < 50 years  Pulse < 100 bpm  SaO2 > 94%  No unilateral leg swelling  No hemoptysis  No recent trauma or surgery  No prior PE or DVT  No hormone use    Work-up negative in ER. Will discharge home. CONSULTS:  None    PROCEDURES:  Procedures        FINAL IMPRESSION      1.  Chest pain, unspecified type          DISPOSITION/PLAN   DISPOSITION Decision To Discharge 01/25/2021 10:22:19 PM      PATIENTREFERRED TO:   Ana James MD   [address] [place] [address]  [phone]    In 3 days        DISCHARGE MEDICATIONS:     New Prescriptions    OMEPRAZOLE (PRILOSEC) 20 MG DELAYED RELEASE CAPSULE    Take 1 capsule by mouth daily           (Please note that portions of this note were completed with a voice recognition program.  Efforts were made to edit thedictations but occasionally words are mis-transcribed.)    PATTI Song PA-C  01/25/21 Yordy Marie MD  01/26/21 2009

## 2021-01-26 NOTE — ED NOTES
20g IV removed from right AC without complication. Catheter intact.       Lindy Christina, RN  01/25/21 [id number]

## 2021-01-27 NOTE — ED PROVIDER NOTES
eMERGENCY dEPARTMENT eNCOUnter   Independent Attestation     Pt Name: Zo Mackey  MRN: [id number]  [place] 1985  Date of evaluation: 1/26/21     Zo Mackey is a 29 y.o. female with CC: Chest Pain      Based on the medical record the care appears appropriate. I was personally available for consultation in the Emergency Department.     Thomas Pratt MD  Attending Emergency Physician                  Raj Reno MD  01/26/21 2009

## 2021-03-19 ENCOUNTER — OFFICE VISIT (OUTPATIENT)
Dept: INTERNAL MEDICINE CLINIC | Age: 36
End: 2021-03-19
Payer: COMMERCIAL

## 2021-03-19 VITALS — OXYGEN SATURATION: 98 % | DIASTOLIC BLOOD PRESSURE: 64 MMHG | HEART RATE: 67 BPM | SYSTOLIC BLOOD PRESSURE: 118 MMHG

## 2021-03-19 DIAGNOSIS — Z13.220 SCREENING CHOLESTEROL LEVEL: ICD-10-CM

## 2021-03-19 DIAGNOSIS — E55.9 VITAMIN D DEFICIENCY: ICD-10-CM

## 2021-03-19 DIAGNOSIS — Z13.29 SCREENING FOR THYROID DISORDER: ICD-10-CM

## 2021-03-19 DIAGNOSIS — R53.83 FATIGUE, UNSPECIFIED TYPE: ICD-10-CM

## 2021-03-19 DIAGNOSIS — F41.9 ANXIETY: ICD-10-CM

## 2021-03-19 DIAGNOSIS — G43.909 MIGRAINE WITHOUT STATUS MIGRAINOSUS, NOT INTRACTABLE, UNSPECIFIED MIGRAINE TYPE: ICD-10-CM

## 2021-03-19 DIAGNOSIS — R56.9 CONVULSIONS, UNSPECIFIED CONVULSION TYPE (HCC): Primary | ICD-10-CM

## 2021-03-19 PROCEDURE — G8484 FLU IMMUNIZE NO ADMIN: HCPCS | Performed by: NURSE PRACTITIONER

## 2021-03-19 PROCEDURE — G8427 DOCREV CUR MEDS BY ELIG CLIN: HCPCS | Performed by: NURSE PRACTITIONER

## 2021-03-19 PROCEDURE — G8417 CALC BMI ABV UP PARAM F/U: HCPCS | Performed by: NURSE PRACTITIONER

## 2021-03-19 PROCEDURE — 99213 OFFICE O/P EST LOW 20 MIN: CPT | Performed by: NURSE PRACTITIONER

## 2021-03-19 PROCEDURE — 4004F PT TOBACCO SCREEN RCVD TLK: CPT | Performed by: NURSE PRACTITIONER

## 2021-03-19 RX ORDER — SUMATRIPTAN 50 MG/1
50 TABLET, FILM COATED ORAL
Qty: 9 TABLET | Refills: 0 | Status: SHIPPED | OUTPATIENT
Start: 2021-03-19 | End: 2021-03-19 | Stop reason: SDUPTHER

## 2021-03-19 RX ORDER — SUMATRIPTAN 50 MG/1
50 TABLET, FILM COATED ORAL
Qty: 9 TABLET | Refills: 0 | Status: SHIPPED | OUTPATIENT
Start: 2021-03-19 | End: 2021-03-19

## 2021-03-19 RX ORDER — LEVETIRACETAM 500 MG/1
TABLET ORAL
Qty: 180 TABLET | Refills: 3 | Status: SHIPPED | OUTPATIENT
Start: 2021-03-19 | End: 2022-04-11

## 2021-03-19 RX ORDER — ESCITALOPRAM OXALATE 20 MG/1
TABLET ORAL
Qty: 90 TABLET | Refills: 3 | Status: SHIPPED | OUTPATIENT
Start: 2021-03-19 | End: 2022-04-11

## 2021-03-19 NOTE — PROGRESS NOTES
Visit Information    Have you changed or started any medications since your last visit including any over-the-counter medicines, vitamins, or herbal medicines? no   Are you having any side effects from any of your medications? -  no  Have you stopped taking any of your medications? Is so, why? -  no    Have you seen any other physician or provider since your last visit? Yes   Have you had any other diagnostic tests since your last visit? No  Have you been seen in the emergency room and/or had an admission to a hospital since we last saw you? Yes - Records Obtained  Have you had your routine dental cleaning in the past 6 months? no    Have you activated your [employer] account? If not, what are your barriers?  Yes     Patient Care Team:  Doc Vivar MD as PCP - General (Internal Medicine)  Doc Vivar MD as PCP - [facility] Provider  HARDIK Choudhury - CNP (Nurse Practitioner)    Medical History Review  Past Medical, Family, and Social History reviewed and does contribute to the patient presenting condition    Health Maintenance   Topic Date Due    Hepatitis C screen  Never done    Varicella vaccine (1 of 2 - 2-dose childhood series) Never done    Pneumococcal 0-64 years Vaccine (1 of 1 - PPSV23) Never done    HIV screen  Never done    COVID-19 Vaccine (1) Never done    DTaP/Tdap/Td vaccine (1 - Tdap) Never done    Flu vaccine (1) Never done    Cervical cancer screen  04/25/2022    Hepatitis A vaccine  Aged Out    Hepatitis B vaccine  Aged Out    Hib vaccine  Aged Out    Meningococcal (ACWY) vaccine  Aged Out         [address]  [place] [id number]  Dept: [phone]  Dept Fax: [phone]    OfficeProgress/Follow Up Note  Date of patient's visit: 3/26/2021  Patient's Name:  Kim Zheng YOB: 1985            Patient Care Team:  Doc Vivar MD as PCP - General (Internal Medicine)  Doc Vivar MD as PCP - [address] [place] [address] Provider  HARDIK Leonard CNP (Nurse Practitioner)    REASON FOR VISIT:  Routine outpatient follow up    HISTORY OF PRESENT ILLNESS:      Chief Complaint   Patient presents with    Medication Refill       History was obtained from the patient. Pat Huitron is a 29 y.o. female here today for medication refills and chronic medical problem follow up. She does have a history of chronic migraine headaches occurring 2 per week. Visual disturbance noted with headaches. Denies nausea, vomiting or phonophobia with headaches. Patient also has a history of seizures last seizure was over 10 years ago. She is currently on Keppra. Patient has not established care with a neurologist due to [place] 19 pandemic.        Patient Active Problem List   Diagnosis    Seizures ([facility] [address]    History of chronic PID    Chronic pelvic pain in female    ASCUS with positive high risk HPV cervical    PVC (premature ventricular contraction)       Health Maintenance Due   Topic Date Due    Hepatitis C screen  Never done    Varicella vaccine (1 of 2 - 2-dose childhood series) Never done    Pneumococcal 0-64 years Vaccine (1 of 1 - PPSV23) Never done    HIV screen  Never done    COVID-19 Vaccine (1) Never done    DTaP/Tdap/Td vaccine (1 - Tdap) Never done    Flu vaccine (1) Never done       MEDICATIONS:      Current Outpatient Medications   Medication Sig Dispense Refill    levETIRAcetam (KEPPRA) 500 MG tablet take 1 tablet by mouth twice a day 180 tablet 3    escitalopram (LEXAPRO) 20 MG tablet take 1 tablet by mouth once daily 90 tablet 3    SUMAtriptan (IMITREX) 50 MG tablet Take 1 tablet by mouth once as needed for Migraine 9 tablet 0    diltiazem (CARDIZEM CD) 120 MG ER capsule Take 1 capsule by mouth daily 30 capsule 0    omeprazole (PRILOSEC) 20 MG delayed release capsule Take 1 capsule by mouth daily (Patient not taking: Reported on 3/19/2021) 30 capsule 0     No current facility-administered medications for this visit. ALLERGIES:      Allergies   Allergen Reactions    Codeine     Pcn [Penicillins]          SOCIAL HISTORY    Reviewed and no change from previous record. Beto Lauren  reports that she has been smoking. She has been smoking about 0.50 packs per day. She has never used smokeless tobacco.    FAMILY HISTORY:    Reviewed and No change from previous visit    REVIEW OF SYSTEMS:    Review of Systems   Constitutional: Positive for fatigue. Negative for chills, diaphoresis, fever and unexpected weight change. HENT: Negative for congestion, postnasal drip, rhinorrhea, sneezing, sore throat and trouble swallowing. Eyes: Positive for photophobia and visual disturbance (with headaches). Respiratory: Negative for cough, chest tightness, shortness of breath and wheezing. Cardiovascular: Negative for chest pain. Gastrointestinal: Negative for constipation, diarrhea, nausea and vomiting. Endocrine: Negative for polydipsia, polyphagia and polyuria. Genitourinary: Negative for difficulty urinating, dysuria, flank pain, frequency and urgency. Musculoskeletal: Negative for arthralgias. Skin: Negative for color change and rash. Neurological: Positive for headaches. Negative for dizziness, tremors, syncope, weakness and numbness. Psychiatric/Behavioral: Negative for confusion and hallucinations. PHYSICAL EXAM:      Vitals:    03/19/21 1519   BP: 118/64   Pulse: 67   SpO2: 98%     BP Readings from Last 3 Encounters:   03/19/21 118/64   01/25/21 120/83   06/08/20 126/79      Wt Readings from Last 3 Encounters:   01/25/21 219 lb 14.4 oz (99.7 kg)   06/08/20 200 lb (90.7 kg)   06/08/20 224 lb 14.4 oz (102 kg)       Physical Exam  Vitals signs reviewed. Constitutional:       Appearance: Normal appearance. HENT:      Head: Normocephalic. Cardiovascular:      Rate and Rhythm: Normal rate and regular rhythm. Pulses: Normal pulses. Heart sounds: Normal heart sounds.    Pulmonary: Effort: Pulmonary effort is normal.      Breath sounds: Normal breath sounds. Abdominal:      General: Bowel sounds are normal.      Palpations: Abdomen is soft. Musculoskeletal: Normal range of motion. General: No swelling, tenderness or deformity. Skin:     General: Skin is warm and dry. Neurological:      General: No focal deficit present. Mental Status: She is alert and oriented to person, place, and time. Psychiatric:         Mood and Affect: Mood normal.         Behavior: Behavior normal.         Thought Content: Thought content normal.         Judgment: Judgment normal.          LABORATORY FINDINGS:    CBC:  Lab Results   Component Value Date    WBC 12.1 01/25/2021    HGB 13.1 01/25/2021     01/25/2021     11/07/2011       BMP:    Lab Results   Component Value Date     01/25/2021    K 4.2 01/25/2021     01/25/2021    CO2 22 01/25/2021    BUN 9 01/25/2021    CREATININE 0.79 01/25/2021    GLUCOSE 94 01/25/2021    GLUCOSE 130 11/07/2011       HEMOGLOBIN A1C: No results found for: LABA1C    FASTING LIPID PANEL:No results found for: CHOL, HDL, TRIG    ASSESSMENT AND PLAN:      1. [facility], [facility]  - levETIRAcetam (KEPPRA) 500 MG tablet; take 1 tablet by mouth twice a day  Dispense: 180 tablet; Refill: 3  - Mary Dickey MD, Neurology, [place]    2. Migraine without status migrainosus, not intractable, unspecified migraine type  - MRI BRAIN WO CONTRAST; Future  - Mary Dickey MD, Neurology, [place]  - SUMAtriptan (IMITREX) 50 MG tablet; Take 1 tablet by mouth once as needed for Migraine  Dispense: 9 tablet; Refill: 0    3. Fatigue, unspecified type  - TSH without Reflex; Future  - Vitamin D 25 Hydroxy; Future  - Vitamin B12 & Folate; Future    4. Anxiety  - escitalopram (LEXAPRO) 20 MG tablet; take 1 tablet by mouth once daily  Dispense: 90 tablet; Refill: 3    5.  Vitamin D deficiency  - Vitamin D 25 Hydroxy; Future    6. Screening for thyroid disorder  - TSH without Reflex; Future    7. Screening cholesterol level  - Lipid Panel; Future      FOLLOW UP AND INSTRUCTIONS:   Return in about 6 weeks (around 4/30/2021). Lisa Anderson received counseling on the following healthy behaviors: nutrition, exercise and medication adherence    Discussed use, benefit, and side effects of prescribed medications. Barriers to medication compliance addressed. All patient questions answered. Patient voiced understanding. Patient given educational materials - see patient instructions    HARDIK Del Rio - CNP   FREDERICK HERNÁNDEZ [facility]  3/26/2021, 5:00 PM    Please note that this chart was generated using voice recognition Dragon dictation software. Although everyeffort was made to ensure the accuracy of this automated transcription, some errors in transcription may have occurred.

## 2021-03-26 ASSESSMENT — ENCOUNTER SYMPTOMS
CONSTIPATION: 0
COUGH: 0
TROUBLE SWALLOWING: 0
WHEEZING: 0
CHEST TIGHTNESS: 0
COLOR CHANGE: 0
NAUSEA: 0
SHORTNESS OF BREATH: 0
RHINORRHEA: 0
VOMITING: 0
PHOTOPHOBIA: 1
DIARRHEA: 0
SORE THROAT: 0

## 2021-04-30 ENCOUNTER — TELEPHONE (OUTPATIENT)
Dept: INTERNAL MEDICINE CLINIC | Age: 36
End: 2021-04-30

## 2022-03-31 DIAGNOSIS — F41.9 ANXIETY: ICD-10-CM

## 2022-03-31 DIAGNOSIS — R56.9 CONVULSIONS, UNSPECIFIED CONVULSION TYPE (HCC): ICD-10-CM

## 2022-03-31 RX ORDER — ESCITALOPRAM OXALATE 20 MG/1
TABLET ORAL
Qty: 90 TABLET | Refills: 3 | OUTPATIENT
Start: 2022-03-31

## 2022-03-31 RX ORDER — LEVETIRACETAM 500 MG/1
TABLET ORAL
Qty: 180 TABLET | Refills: 3 | OUTPATIENT
Start: 2022-03-31

## 2022-04-10 DIAGNOSIS — R56.9 CONVULSIONS, UNSPECIFIED CONVULSION TYPE (HCC): ICD-10-CM

## 2022-04-10 DIAGNOSIS — F41.9 ANXIETY: ICD-10-CM

## 2022-04-11 RX ORDER — LEVETIRACETAM 500 MG/1
TABLET ORAL
Qty: 180 TABLET | Refills: 3 | Status: SHIPPED | OUTPATIENT
Start: 2022-04-11

## 2022-04-11 RX ORDER — ESCITALOPRAM OXALATE 20 MG/1
TABLET ORAL
Qty: 90 TABLET | Refills: 3 | Status: SHIPPED | OUTPATIENT
Start: 2022-04-11

## 2023-05-05 DIAGNOSIS — R56.9 CONVULSIONS, UNSPECIFIED CONVULSION TYPE (HCC): ICD-10-CM

## 2023-05-05 DIAGNOSIS — F41.9 ANXIETY: ICD-10-CM

## 2023-05-05 RX ORDER — ESCITALOPRAM OXALATE 20 MG/1
TABLET ORAL
Qty: 90 TABLET | Refills: 3 | OUTPATIENT
Start: 2023-05-05

## 2023-05-05 RX ORDER — LEVETIRACETAM 500 MG/1
TABLET ORAL
Qty: 180 TABLET | Refills: 3 | OUTPATIENT
Start: 2023-05-05